# Patient Record
Sex: FEMALE
[De-identification: names, ages, dates, MRNs, and addresses within clinical notes are randomized per-mention and may not be internally consistent; named-entity substitution may affect disease eponyms.]

---

## 2023-07-07 ENCOUNTER — APPOINTMENT (OUTPATIENT)
Dept: DERMATOLOGY | Facility: CLINIC | Age: 33
End: 2023-07-07
Payer: SELF-PAY

## 2023-07-07 ENCOUNTER — TRANSCRIPTION ENCOUNTER (OUTPATIENT)
Age: 33
End: 2023-07-07

## 2023-07-07 DIAGNOSIS — L81.1 CHLOASMA: ICD-10-CM

## 2023-07-07 DIAGNOSIS — L30.9 DERMATITIS, UNSPECIFIED: ICD-10-CM

## 2023-07-07 PROBLEM — Z00.00 ENCOUNTER FOR PREVENTIVE HEALTH EXAMINATION: Status: ACTIVE | Noted: 2023-07-07

## 2023-07-07 PROCEDURE — 99204 OFFICE O/P NEW MOD 45 MIN: CPT

## 2023-07-07 RX ORDER — TACROLIMUS 1 MG/G
0.1 OINTMENT TOPICAL
Qty: 1 | Refills: 5 | Status: ACTIVE | COMMUNITY
Start: 2023-07-07 | End: 1900-01-01

## 2023-12-26 ENCOUNTER — APPOINTMENT (OUTPATIENT)
Dept: PSYCHIATRY | Facility: CLINIC | Age: 33
End: 2023-12-26
Payer: COMMERCIAL

## 2023-12-26 PROCEDURE — 99205 OFFICE O/P NEW HI 60 MIN: CPT

## 2023-12-26 NOTE — DISCUSSION/SUMMARY
[Low acute suicide risk] : Low acute suicide risk [No] : No [FreeTextEntry1] : RISK ASSESSMENT Acute risk factors for self-injurious/aggressive behaviors include: anxiety, depressive symptoms, work stress Chronic risk factors include: hx depression and anxiety, immigrant Protective factors include:  with supportive , domiciled, future oriented, help seeking, denies SIIP/NSSIIP/HIIP, no hx SAs/NSSIB/aggression, no past psych hosp, sobriety. Overall, the pt is at a low acute and low chronic risk for self-injurious, suicidal or aggressive behaviors, and is appropriate for outpatient care at this time.

## 2023-12-26 NOTE — PLAN
[FreeTextEntry4] : On initial assessment 12/26/2023: 34 yo F,  1.5 years ago,  is 3rd year medical resident, domiciled with , employed as physician, intensivist at [employer], PPH notable for anxiety,  past psych hosp, denies past SI/SAs, denies substance use, denies legal hx/hx of violence, no sig PMH, presents for treatment for anxiety and depression.    On initial assessment, symptoms are notable for depressive symptoms, and symptoms of anxiety, with associated muscle soreness, irritability, lower energy, easily fatigued, feeling keyed up. The differential includes anxiety, MDD, r/o OCPD. Plan to start Prozac 5mg since pt is medication naive, to up-titrate as tolerated.  PLAN -Discussed the diagnosis, treatment, alternatives to recommended treatment, risk Vs benefits of treatment and no treatment and alternative treatments. -Lab/other tests: advised to connect with PCP for bloodwork/annual physical -Medication: Start Prozac 5mg daily.  SSRI side effects including but not limited to GI side effects, headaches, dizziness, serotonin syndrome, hyponatremia, QT prolongation, weight gain, decreased libido, and black box warning of SI in patients younger than 24 were discussed. -Discussed recommendation for aerobic exercise -Discussed sleep hygiene -Educated patient of importance of remaining abstinent from drugs and alcohol. -Emergency procedures were discussed: pt. educated to call 911 or go to nearest ER for worsening of symptoms/suicidal/homicidal ideation. -Pt to connect with ind therapist soon, to provide writer with consent to contact. -RTC in 2 weeks or earlier as needed -Patient given opportunity to ask questions and their questions were answered and they expressed understanding and agreement with above plan.  I spent a total of 60 minutes reviewing past medical records, evaluating the patient, discussing treatment options with the patient, prescribing medication, and documenting in the EMR.

## 2023-12-26 NOTE — HISTORY OF PRESENT ILLNESS
[FreeTextEntry1] :  Pt seen by writer for initial appointment on 12/26/2023. ISTOP Reference #: [id number] nothing listed  Pt is accompanied by her , Allan Payne, with pt's verbal consent.   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   On interview, pt said that she recently started a new job and has some worsening anxiety and depression. Pt has had difficulty on and off since a teenager, but has never seen a psychiatrist before. Pt said that for the past several months has not been sleeping much, though improved over the past 2 weeks. Pt lives in [facility]. 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 Given the lack of enjoyment in work that was not expected, pt felt more depressed/anxious. Pt likes her boss. Pt was expecting 2 weeks of ICU per year, but will do 30 days in the next 90 days.  Per pt's , pt often works until 8-9pm even after she gets home completing tasks from work. Pt says that she has difficulty delegating tasks. Says that she was detail oriented, less so now due to the volume of her work.   Per pt's , pt has enjoyed cooking, caring for plants. Pt says that she has enjoyed travel. Pt said that she feels like she missed out on her 20's, would like to explore [place] more. Pt misses dancing and music. Pt says that she feels her  has been very supportive.   Mood: "better" Anhedonia: denies Motivation: low regarding work Sleep: asleep 10pm, wakes up 6am. Pt falls asleep on the couch while  is watching TV, then moves to bed around 2am. denies other interruptions. Longest latency to falling asleep is 30 min. At worst, before first day of work in ICU, was not able to sleep.  Appetite: better Energy: lower than would like Feelings of guilt/worthlessness: endorses SI: denies past or current  NSSI: denies past or current  HI: denies past or current    Anxiety: anxious about work, patients at work, had anxiety about immigration and finances that improved, hx anxiety about parents. Now feels that she is having ruminative thoughts 70-80% of the day. Muscle soreness: endorses Irritability: endorses Focus: denies issues  Endorses feeling easily fatigued. Endorses feeling keyed up Panic attacks: once a few years ago.   Disordered eating Hx: denies current or hx purging/restricting/binging   EtOH use:  denies past or current  Drug use:  denies past or current  Tobacco use:  denies past or current  Caffeine use: 1 cup coffee in AM, rarely 1 cup in PM, denies tea, soda once per month.   Current or past psychosis: denies past or current  Current or past nicolas:  denies past or current    Trauma: pt did not have any money when [place] came to the [place]. Pt lived in an apartment with 6 other people. Pt's parents offered to help pt, but pt declined.   Firearm access: denies Current medications: mvm  Therapy: denies, signed up for therapy, to start tomorrow.  Pt says that she primarily wants help with guilt about perceived wrong decisions, pervasive thoughts. [FreeTextEntry2] : Pt had pre-menstrual dysphoria 1-2 days before her menstruation would be tearful and anxious, then it would pass. Started medical school at age 18 years old in [place], did not like it, and had depression and anxiety.   Hx depression: November 2023 for over 2 weeks, +anhedonia, depressed, low energy, low appetite, less sleep, feelings of guilt/worthlessness, denies SIIP/NSSIIP/HIIP.

## 2023-12-26 NOTE — PHYSICAL EXAM
[None] : none [Cooperative] : cooperative [Euthymic] : euthymic [Depressed] : depressed [Anxious] : anxious [Full] : full [Clear] : clear [Linear/Goal Directed] : linear/goal directed [Average] : average [WNL] : within normal limits [de-identified] : ranging from anxious, dysphoric to smiling, congruent with content [FreeTextEntry7] : denies SIIP/NSSIIP/HIIP, no overt delusions

## 2023-12-26 NOTE — SOCIAL HISTORY
[FreeTextEntry1] : Born: [facility].  Grew up: Partly in [place], partly in [place]. Grew up with mother, father, and maternal grandfather and paternal grandmother and paternal grandmother. Only child. Father: hardworking, worked in finance, great parent. Very supportive. He was ill on and off with cirrhosis, RONQUILLO. He was very sick when pt was 2 year old. He got a transplant when pt was 22 years old. Pt only really got ill in the year leading up to the transplant. Mother: great parent, very involved with education.  at a large multinational firm. Despite being busy they were very present in pt's life. Both parents had high expectations of her.  Pt said that she went to medical school because her parents wanted her to do so. Pt said that she tried to quit several times, parents would not let her. [place] liked the way medicine is practiced here. Pt realized that she enjoys pulm crit care, pulm HTN specifically.  Pt knew Allan, her  for 5 years.  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

## 2024-01-11 ENCOUNTER — APPOINTMENT (OUTPATIENT)
Dept: PSYCHIATRY | Facility: CLINIC | Age: 34
End: 2024-01-11

## 2024-01-11 NOTE — HISTORY OF PRESENT ILLNESS
[FreeTextEntry1] : Social:  Medications: Prozac:  Mood: Anxiety: Panic attacks: Motivation: Anhedonia: Appetite: Sleep: Energy: Focus: Guilt/worthlessness: Thoughts of death: Thoughts of harming self: Thoughts of harming others:  EtOH use: Drug use: Tobacco use: Caffeine use:

## 2024-01-11 NOTE — PLAN
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  On initial assessment, symptoms are notable for depressive symptoms, and symptoms of anxiety, with associated muscle soreness, irritability, lower energy, easily fatigued, feeling keyed up. The differential includes anxiety, MDD, r/o OCPD. Plan to start Prozac 5mg since pt is medication naive, to up-titrate as tolerated.  On follow up assessments: 1/11/2024:  PLAN -Discussed the diagnosis, treatment, alternatives to recommended treatment, risk Vs benefits of treatment and no treatment and alternative treatments. -Lab/other tests: advised to connect with PCP for bloodwork/annual physical -Medication: Start Prozac 5mg daily.  SSRI side effects including but not limited to GI side effects, headaches, dizziness, serotonin syndrome, hyponatremia, QT prolongation, weight gain, decreased libido, and black box warning of SI in patients younger than 24 were discussed. -Discussed recommendation for aerobic exercise -Discussed sleep hygiene -Educated patient of importance of remaining abstinent from drugs and alcohol. -Emergency procedures were discussed: pt. educated to call 911 or go to nearest ER for worsening of symptoms/suicidal/homicidal ideation. -Pt to connect with ind therapist soon, to provide writer with consent to contact. -RTC in 2 weeks or earlier as needed -Patient given opportunity to ask questions and their questions were answered and they expressed understanding and agreement with above plan.

## 2024-02-02 ENCOUNTER — APPOINTMENT (OUTPATIENT)
Dept: PSYCHIATRY | Facility: CLINIC | Age: 34
End: 2024-02-02

## 2024-02-02 NOTE — PLAN
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  On initial assessment, symptoms are notable for depressive symptoms, and symptoms of anxiety, with associated muscle soreness, irritability, lower energy, easily fatigued, feeling keyed up. The differential includes anxiety, MDD, r/o OCPD. Plan to start Prozac 5mg since pt is medication naive, to up-titrate as tolerated.  On follow up assessment: 2/2/2024:  PLAN -Discussed the diagnosis, treatment, alternatives to recommended treatment, risk Vs benefits of treatment and no treatment and alternative treatments. -Lab/other tests: advised to connect with PCP for bloodwork/annual physical -Medication: Start Prozac 5mg daily.  SSRI side effects including but not limited to GI side effects, headaches, dizziness, serotonin syndrome, hyponatremia, QT prolongation, weight gain, decreased libido, and black box warning of SI in patients younger than 24 were discussed. -Discussed recommendation for aerobic exercise -Discussed sleep hygiene -Educated patient of importance of remaining abstinent from drugs and alcohol. -Emergency procedures were discussed: pt. educated to call 911 or go to nearest ER for worsening of symptoms/suicidal/homicidal ideation. -Pt to connect with ind therapist soon, to provide writer with consent to contact. -RTC in 2 weeks or earlier as needed -Patient given opportunity to ask questions and their questions were answered and they expressed understanding and agreement with above plan.

## 2024-02-02 NOTE — PLAN
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  On initial assessment, symptoms are notable for depressive symptoms, and symptoms of anxiety, with associated muscle soreness, irritability, lower energy, easily fatigued, feeling keyed up. The differential includes anxiety, MDD, r/o OCPD. Plan to start Prozac 5mg since pt is medication naive, to up-titrate as tolerated.  On follow up assessment: 2/2/2024:  PLAN -Discussed the diagnosis, treatment, alternatives to recommended treatment, risk Vs benefits of treatment and no treatment and alternative treatments. -Lab/other tests: advised to connect with PCP for bloodwork/annual physical -Medication: Start Prozac 5mg daily.  SSRI side effects including but not limited to GI side effects, headaches, dizziness, serotonin syndrome, hyponatremia, QT prolongation, weight gain, decreased libido, and black box warning of SI in patients younger than 24 were discussed. -Discussed recommendation for aerobic exercise -Discussed sleep hygiene -Educated patient of importance of remaining abstinent from drugs and alcohol. -Emergency procedures were discussed: pt. educated to call 911 or go to nearest ER for worsening of symptoms/suicidal/homicidal ideation. -Pt to connect with ind therapist soon, to provide writer with consent to contact. -RTC in 2 weeks or earlier as needed -Patient given opportunity to ask questions and their questions were answered and they expressed understanding and agreement with above plan.

## 2024-02-02 NOTE — DISCUSSION/SUMMARY
[FreeTextEntry1] : Writer called pt, pt said she rescheduled to 2/9/2024, is feeling better generally, to discuss more at next visit. No acute issues elicited at this time.

## 2024-02-09 ENCOUNTER — APPOINTMENT (OUTPATIENT)
Dept: PSYCHIATRY | Facility: CLINIC | Age: 34
End: 2024-02-09
Payer: COMMERCIAL

## 2024-02-09 PROCEDURE — 99214 OFFICE O/P EST MOD 30 MIN: CPT

## 2024-02-09 NOTE — HISTORY OF PRESENT ILLNESS
[FreeTextEntry1] : Social: Was working nights this past week. Work is still busy. To have meeting next week to discuss changing her schedule with her work.  is supportive, pt says her family wants her to quit.  matched to oncology fellowship at [facility].   Therapist gave homework to eat ice cream, go for a walk, feels it is helpful.  Medications: Prozac: Taking 5mg daily. Takes it before leaves for work. Endorses adherence. Denies side effects. Discussed plan to increase to 10mg daily dose.  Mood: Feeling a lot better, "much better." Anxiety: "50% better from the last time" Panic attacks: denies Motivation: slightly better Anhedonia: enjoyed going to [place] city. Appetite: good Sleep: short latency to falling asleep, rough schedule when working days is asleep 10pm, wakes up 6am. Pt falls asleep on the couch while  is watching TV, then moves to bed around 2am. Energy: improving but lower than would like. Focus: better Guilt/worthlessness: endorses. Feels this is her biggest symptom, though it is improving from before. Thoughts of death: denies Thoughts of harming self: denies Thoughts of harming others: denies  EtOH use: 1-2 drinks every 3 months Drug use: denies Tobacco use: denies Caffeine use: 1 cup of coffee per day.

## 2024-02-09 NOTE — PLAN
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  On initial assessment, symptoms are notable for depressive symptoms, and symptoms of anxiety, with associated muscle soreness, irritability, lower energy, easily fatigued, feeling keyed up. The differential includes anxiety, MDD, r/o OCPD. Plan to start Prozac 5mg since pt is medication naive, to up-titrate as tolerated.  On follow up assessment: 2/9/2024: Depression and anxiety symptoms improving, though with residual ongoing anxiety and significant feelings of guilt/worthlessness. Increasing Prozac to 10mg daily.  PLAN -Discussed the diagnosis, treatment, alternatives to recommended treatment, risk Vs benefits of treatment and no treatment and alternative treatments. -Lab/other tests: advised to connect with PCP for bloodwork/annual physical -Medication: Increase Prozac to 10mg daily.  SSRI side effects including but not limited to GI side effects, headaches, dizziness, serotonin syndrome, hyponatremia, QT prolongation, weight gain, decreased libido, and black box warning of SI in patients younger than 24 were discussed. -Discussed recommendation for aerobic exercise -Discussed sleep hygiene -Educated patient of importance of remaining abstinent from drugs and alcohol. -Emergency procedures were discussed: pt. educated to call 911 or go to nearest ER for worsening of symptoms/suicidal/homicidal ideation. -Individual therapy with Kaitlyn Van [phone] -RT in 2-4 weeks or earlier as needed -Patient given opportunity to ask questions and their questions were answered and they expressed understanding and agreement with above plan.

## 2024-02-09 NOTE — PHYSICAL EXAM
[None] : none [Cooperative] : cooperative [Euthymic] : euthymic [Full] : full [Clear] : clear [Linear/Goal Directed] : linear/goal directed [Average] : average [WNL] : within normal limits [FreeTextEntry8] : "much better" [de-identified] : ranging from anxious, less dysphoric, ranging smiling, congruent with content [FreeTextEntry7] : denies SIIP/NSSIIP/HIIP, no overt delusions

## 2024-03-07 ENCOUNTER — APPOINTMENT (OUTPATIENT)
Dept: PSYCHIATRY | Facility: CLINIC | Age: 34
End: 2024-03-07

## 2024-03-07 NOTE — PLAN
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  On initial assessment, symptoms are notable for depressive symptoms, and symptoms of anxiety, with associated muscle soreness, irritability, lower energy, easily fatigued, feeling keyed up. The differential includes anxiety, MDD, r/o OCPD. Plan to start Prozac 5mg since pt is medication naive, to up-titrate as tolerated.  On follow up assessment: 2/9/2024: Depression and anxiety symptoms improving, though with residual ongoing anxiety and significant feelings of guilt/worthlessness. Increasing Prozac to 10mg daily. 3/7/2024:  PLAN -Discussed the diagnosis, treatment, alternatives to recommended treatment, risk Vs benefits of treatment and no treatment and alternative treatments. -Lab/other tests: advised to connect with PCP for bloodwork/annual physical -Medication: Increase Prozac to 10mg daily.  SSRI side effects including but not limited to GI side effects, headaches, dizziness, serotonin syndrome, hyponatremia, QT prolongation, weight gain, decreased libido, and black box warning of SI in patients younger than 24 were discussed. -Discussed recommendation for aerobic exercise -Discussed sleep hygiene -Educated patient of importance of remaining abstinent from drugs and alcohol. -Emergency procedures were discussed: pt. educated to call 911 or go to nearest ER for worsening of symptoms/suicidal/homicidal ideation. -Individual therapy with Kaitlyn Van [phone] -[facility] in 2-4 weeks or earlier as needed -Patient given opportunity to ask questions and their questions were answered and they expressed understanding and agreement with above plan.

## 2024-03-07 NOTE — HISTORY OF PRESENT ILLNESS
[FreeTextEntry1] : Social: LOCATION LOCATIN LOCATION OF PT Medications: Prozac 10mg:  Mood: Anxiety: Panic attacks: Motivation: Anhedonia: Appetite: Sleep: Energy: Focus: Guilt/worthlessness: Thoughts of death: Thoughts of harming self: Thoughts of harming others:  EtOH use: Drug use: Tobacco use: Caffeine use:

## 2024-04-04 ENCOUNTER — NON-APPOINTMENT (OUTPATIENT)
Age: 34
End: 2024-04-04

## 2024-04-04 NOTE — DISCUSSION/SUMMARY
[FreeTextEntry1] : Pt called writer, writer called back: Pt said that work has been stressful, she is leaving her position to work somewhere else. Discussed option to setup appt to discuss Prozac dosing, pt declined at this time. No SI/NSSI/HI, no acute issues elicited at this time.

## 2024-05-31 ENCOUNTER — APPOINTMENT (OUTPATIENT)
Dept: OPHTHALMOLOGY | Facility: CLINIC | Age: 34
End: 2024-05-31
Payer: COMMERCIAL

## 2024-05-31 ENCOUNTER — NON-APPOINTMENT (OUTPATIENT)
Age: 34
End: 2024-05-31

## 2024-05-31 PROCEDURE — 00009: CPT | Mod: NC

## 2024-06-17 ENCOUNTER — APPOINTMENT (OUTPATIENT)
Dept: OPHTHALMOLOGY | Facility: CLINIC | Age: 34
End: 2024-06-17

## 2024-06-26 ENCOUNTER — APPOINTMENT (OUTPATIENT)
Dept: INTERNAL MEDICINE | Facility: CLINIC | Age: 34
End: 2024-06-26

## 2024-06-26 VITALS
SYSTOLIC BLOOD PRESSURE: 118 MMHG | WEIGHT: 117 LBS | BODY MASS INDEX: 18.58 KG/M2 | HEIGHT: 66.5 IN | TEMPERATURE: 98.2 F | HEART RATE: 82 BPM | DIASTOLIC BLOOD PRESSURE: 64 MMHG | OXYGEN SATURATION: 99 %

## 2024-06-26 DIAGNOSIS — Z78.9 OTHER SPECIFIED HEALTH STATUS: ICD-10-CM

## 2024-06-26 DIAGNOSIS — Z00.00 ENCOUNTER FOR GENERAL ADULT MEDICAL EXAMINATION W/OUT ABNORMAL FINDINGS: ICD-10-CM

## 2024-06-26 DIAGNOSIS — Z83.79 FAMILY HISTORY OF OTHER DISEASES OF THE DIGESTIVE SYSTEM: ICD-10-CM

## 2024-06-26 DIAGNOSIS — Z82.49 FAMILY HISTORY OF ISCHEMIC HEART DISEASE AND OTHER DISEASES OF THE CIRCULATORY SYSTEM: ICD-10-CM

## 2024-06-26 DIAGNOSIS — Z83.3 FAMILY HISTORY OF DIABETES MELLITUS: ICD-10-CM

## 2024-06-26 DIAGNOSIS — Z80.3 FAMILY HISTORY OF MALIGNANT NEOPLASM OF BREAST: ICD-10-CM

## 2024-06-26 PROCEDURE — 36415 COLL VENOUS BLD VENIPUNCTURE: CPT

## 2024-06-26 PROCEDURE — 99385 PREV VISIT NEW AGE 18-39: CPT

## 2024-06-27 ENCOUNTER — APPOINTMENT (OUTPATIENT)
Dept: PSYCHIATRY | Facility: CLINIC | Age: 34
End: 2024-06-27
Payer: COMMERCIAL

## 2024-06-27 DIAGNOSIS — F32.9 MAJOR DEPRESSIVE DISORDER, SINGLE EPISODE, UNSPECIFIED: ICD-10-CM

## 2024-06-27 DIAGNOSIS — F41.9 ANXIETY DISORDER, UNSPECIFIED: ICD-10-CM

## 2024-06-27 LAB
25(OH)D3 SERPL-MCNC: 25.3 NG/ML
ALBUMIN SERPL ELPH-MCNC: 4.5 G/DL
ALP BLD-CCNC: 67 U/L
ALT SERPL-CCNC: 24 U/L
ANION GAP SERPL CALC-SCNC: 15 MMOL/L
APPEARANCE: CLEAR
AST SERPL-CCNC: 21 U/L
BACTERIA: ABNORMAL /HPF
BILIRUB SERPL-MCNC: 1.5 MG/DL
BILIRUBIN URINE: NEGATIVE
BLOOD URINE: NEGATIVE
BUN SERPL-MCNC: 16 MG/DL
CALCIUM SERPL-MCNC: 10 MG/DL
CAST: 0 /LPF
CHLORIDE SERPL-SCNC: 104 MMOL/L
CHOLEST SERPL-MCNC: 172 MG/DL
CO2 SERPL-SCNC: 22 MMOL/L
COLOR: YELLOW
CREAT SERPL-MCNC: 0.76 MG/DL
CRP SERPL-MCNC: <3 MG/L
DSDNA AB SER-ACNC: <1 IU/ML
EGFR: 106 ML/MIN/1.73M2
EPITHELIAL CELLS: 4 /HPF
ERYTHROCYTE [SEDIMENTATION RATE] IN BLOOD BY WESTERGREN METHOD: 23 MM/HR
ESTIMATED AVERAGE GLUCOSE: 103 MG/DL
FERRITIN SERPL-MCNC: 33 NG/ML
FOLATE SERPL-MCNC: 19.5 NG/ML
GLUCOSE QUALITATIVE U: NEGATIVE MG/DL
GLUCOSE SERPL-MCNC: 88 MG/DL
HBA1C MFR BLD HPLC: 5.2 %
HCT VFR BLD CALC: 44.5 %
HDLC SERPL-MCNC: 59 MG/DL
HGB BLD-MCNC: 14.1 G/DL
IRON SATN MFR SERPL: 31 %
IRON SERPL-MCNC: 124 UG/DL
KETONES URINE: NEGATIVE MG/DL
LDLC SERPL CALC-MCNC: 97 MG/DL
LEUKOCYTE ESTERASE URINE: ABNORMAL
MCHC RBC-ENTMCNC: 29.1 PG
MCHC RBC-ENTMCNC: 31.7 GM/DL
MCV RBC AUTO: 91.8 FL
MICROSCOPIC-UA: NORMAL
NITRITE URINE: NEGATIVE
NONHDLC SERPL-MCNC: 114 MG/DL
PH URINE: 7.5
PLATELET # BLD AUTO: 304 K/UL
POTASSIUM SERPL-SCNC: 4.7 MMOL/L
PROT SERPL-MCNC: 7.9 G/DL
PROTEIN URINE: NORMAL MG/DL
RBC # BLD: 4.85 M/UL
RBC # FLD: 14.2 %
RED BLOOD CELLS URINE: 5 /HPF
RHEUMATOID FACT SER QL: <10 IU/ML
SODIUM SERPL-SCNC: 140 MMOL/L
SPECIFIC GRAVITY URINE: 1.02
TIBC SERPL-MCNC: 407 UG/DL
TRIGL SERPL-MCNC: 87 MG/DL
TSH SERPL-ACNC: 1.8 UIU/ML
UIBC SERPL-MCNC: 283 UG/DL
UROBILINOGEN URINE: 0.2 MG/DL
VIT B12 SERPL-MCNC: 590 PG/ML
WBC # FLD AUTO: 5.46 K/UL
WHITE BLOOD CELLS URINE: 0 /HPF

## 2024-06-27 PROCEDURE — 99214 OFFICE O/P EST MOD 30 MIN: CPT | Mod: 95

## 2024-06-27 PROCEDURE — G2211 COMPLEX E/M VISIT ADD ON: CPT | Mod: 95

## 2024-06-28 ENCOUNTER — APPOINTMENT (OUTPATIENT)
Dept: OPHTHALMOLOGY | Facility: CLINIC | Age: 34
End: 2024-06-28

## 2024-06-28 LAB — ANA SER IF-ACNC: NEGATIVE

## 2024-07-01 ENCOUNTER — APPOINTMENT (OUTPATIENT)
Dept: HUMAN REPRODUCTION | Facility: CLINIC | Age: 34
End: 2024-07-01

## 2024-07-02 ENCOUNTER — APPOINTMENT (OUTPATIENT)
Dept: HUMAN REPRODUCTION | Facility: CLINIC | Age: 34
End: 2024-07-02
Payer: COMMERCIAL

## 2024-07-02 LAB — DNA PLOIDY SPEC FC-IMP: NORMAL

## 2024-07-02 PROCEDURE — 99205 OFFICE O/P NEW HI 60 MIN: CPT

## 2024-07-03 ENCOUNTER — APPOINTMENT (OUTPATIENT)
Dept: HUMAN REPRODUCTION | Facility: CLINIC | Age: 34
End: 2024-07-03
Payer: COMMERCIAL

## 2024-07-03 PROCEDURE — 99213 OFFICE O/P EST LOW 20 MIN: CPT | Mod: 25

## 2024-07-03 PROCEDURE — 76857 US EXAM PELVIC LIMITED: CPT

## 2024-07-03 PROCEDURE — 36415 COLL VENOUS BLD VENIPUNCTURE: CPT

## 2024-12-27 ENCOUNTER — APPOINTMENT (OUTPATIENT)
Dept: HUMAN REPRODUCTION | Facility: CLINIC | Age: 34
End: 2024-12-27
Payer: COMMERCIAL

## 2024-12-27 PROCEDURE — 99215 OFFICE O/P EST HI 40 MIN: CPT

## 2025-01-13 ENCOUNTER — NON-APPOINTMENT (OUTPATIENT)
Age: 35
End: 2025-01-13

## 2025-01-14 ENCOUNTER — APPOINTMENT (OUTPATIENT)
Dept: PSYCHIATRY | Facility: CLINIC | Age: 35
End: 2025-01-14
Payer: COMMERCIAL

## 2025-01-14 PROCEDURE — 99214 OFFICE O/P EST MOD 30 MIN: CPT | Mod: 95

## 2025-01-15 RX ORDER — FLUOXETINE HYDROCHLORIDE 10 MG/1
10 CAPSULE ORAL DAILY
Qty: 45 | Refills: 0 | Status: ACTIVE | COMMUNITY
Start: 2025-01-15 | End: 1900-01-01

## 2025-02-28 ENCOUNTER — APPOINTMENT (OUTPATIENT)
Dept: PSYCHIATRY | Facility: CLINIC | Age: 35
End: 2025-02-28
Payer: COMMERCIAL

## 2025-02-28 DIAGNOSIS — F32.9 MAJOR DEPRESSIVE DISORDER, SINGLE EPISODE, UNSPECIFIED: ICD-10-CM

## 2025-02-28 DIAGNOSIS — F41.9 ANXIETY DISORDER, UNSPECIFIED: ICD-10-CM

## 2025-02-28 PROCEDURE — 99214 OFFICE O/P EST MOD 30 MIN: CPT

## 2025-02-28 PROCEDURE — G2211 COMPLEX E/M VISIT ADD ON: CPT

## 2025-03-07 ENCOUNTER — APPOINTMENT (OUTPATIENT)
Dept: ANTEPARTUM | Facility: CLINIC | Age: 35
End: 2025-03-07
Payer: COMMERCIAL

## 2025-03-07 ENCOUNTER — ASOB RESULT (OUTPATIENT)
Age: 35
End: 2025-03-07

## 2025-03-07 PROCEDURE — 76813 OB US NUCHAL MEAS 1 GEST: CPT

## 2025-03-07 PROCEDURE — 76801 OB US < 14 WKS SINGLE FETUS: CPT

## 2025-05-05 ENCOUNTER — ASOB RESULT (OUTPATIENT)
Age: 35
End: 2025-05-05

## 2025-05-05 ENCOUNTER — APPOINTMENT (OUTPATIENT)
Dept: ANTEPARTUM | Facility: CLINIC | Age: 35
End: 2025-05-05
Payer: COMMERCIAL

## 2025-05-05 PROCEDURE — 76811 OB US DETAILED SNGL FETUS: CPT

## 2025-05-23 ENCOUNTER — APPOINTMENT (OUTPATIENT)
Dept: PSYCHIATRY | Facility: CLINIC | Age: 35
End: 2025-05-23
Payer: COMMERCIAL

## 2025-05-23 DIAGNOSIS — F41.9 ANXIETY DISORDER, UNSPECIFIED: ICD-10-CM

## 2025-05-23 DIAGNOSIS — F32.9 MAJOR DEPRESSIVE DISORDER, SINGLE EPISODE, UNSPECIFIED: ICD-10-CM

## 2025-05-23 DIAGNOSIS — F32.5 MAJOR DEPRESSIVE DISORDER, SINGLE EPISODE, IN FULL REMISSION: ICD-10-CM

## 2025-05-23 PROCEDURE — 99214 OFFICE O/P EST MOD 30 MIN: CPT | Mod: 95

## 2025-05-23 PROCEDURE — G2211 COMPLEX E/M VISIT ADD ON: CPT | Mod: 95

## 2025-05-26 ENCOUNTER — TRANSCRIPTION ENCOUNTER (OUTPATIENT)
Age: 35
End: 2025-05-26

## 2025-06-23 ENCOUNTER — NON-APPOINTMENT (OUTPATIENT)
Age: 35
End: 2025-06-23

## 2025-06-25 ENCOUNTER — APPOINTMENT (OUTPATIENT)
Dept: MATERNAL FETAL MEDICINE | Facility: CLINIC | Age: 35
End: 2025-06-25

## 2025-06-25 ENCOUNTER — APPOINTMENT (OUTPATIENT)
Dept: PEDIATRIC CARDIOLOGY | Facility: CLINIC | Age: 35
End: 2025-06-25

## 2025-06-25 ENCOUNTER — ASOB RESULT (OUTPATIENT)
Age: 35
End: 2025-06-25

## 2025-06-25 PROBLEM — O24.419 GESTATIONAL DIABETES MELLITUS (GDM), ANTEPARTUM, GESTATIONAL DIABETES METHOD OF CONTROL UNSPECIFIED: Status: ACTIVE | Noted: 2025-06-25

## 2025-06-25 PROCEDURE — 76825 ECHO EXAM OF FETAL HEART: CPT

## 2025-06-25 PROCEDURE — 99202 OFFICE O/P NEW SF 15 MIN: CPT | Mod: 25

## 2025-06-25 PROCEDURE — 93325 DOPPLER ECHO COLOR FLOW MAPG: CPT | Mod: 59

## 2025-06-25 PROCEDURE — 76820 UMBILICAL ARTERY ECHO: CPT

## 2025-06-25 PROCEDURE — 76827 ECHO EXAM OF FETAL HEART: CPT

## 2025-06-25 PROCEDURE — G0109 DIAB MANAGE TRN IND/GROUP: CPT | Mod: 95

## 2025-06-25 PROCEDURE — 76821 MIDDLE CEREBRAL ARTERY ECHO: CPT

## 2025-06-25 RX ORDER — URINE ACETONE TEST STRIPS
STRIP MISCELLANEOUS
Qty: 2 | Refills: 1 | Status: ACTIVE | COMMUNITY
Start: 2025-06-25 | End: 1900-01-01

## 2025-06-25 RX ORDER — LANCETS 33 GAUGE
EACH MISCELLANEOUS
Qty: 1 | Refills: 2 | Status: ACTIVE | COMMUNITY
Start: 2025-06-25 | End: 1900-01-01

## 2025-06-25 RX ORDER — BLOOD-GLUCOSE SENSOR
EACH MISCELLANEOUS
Qty: 3 | Refills: 3 | Status: ACTIVE | COMMUNITY
Start: 2025-06-25 | End: 1900-01-01

## 2025-06-25 RX ORDER — BLOOD-GLUCOSE METER
W/DEVICE KIT MISCELLANEOUS
Qty: 1 | Refills: 0 | Status: ACTIVE | COMMUNITY
Start: 2025-06-25 | End: 1900-01-01

## 2025-06-25 RX ORDER — BLOOD-GLUCOSE METER
KIT MISCELLANEOUS
Qty: 2 | Refills: 2 | Status: ACTIVE | COMMUNITY
Start: 2025-06-25 | End: 1900-01-01

## 2025-06-27 RX ORDER — BLOOD-GLUCOSE SENSOR
EACH MISCELLANEOUS
Qty: 2 | Refills: 3 | Status: ACTIVE | COMMUNITY
Start: 2025-06-26 | End: 1900-01-01

## 2025-07-09 ENCOUNTER — APPOINTMENT (OUTPATIENT)
Dept: MATERNAL FETAL MEDICINE | Facility: CLINIC | Age: 35
End: 2025-07-09

## 2025-07-09 ENCOUNTER — NON-APPOINTMENT (OUTPATIENT)
Age: 35
End: 2025-07-09

## 2025-07-09 ENCOUNTER — ASOB RESULT (OUTPATIENT)
Age: 35
End: 2025-07-09

## 2025-07-09 PROCEDURE — G0108 DIAB MANAGE TRN  PER INDIV: CPT | Mod: 95

## 2025-07-14 ENCOUNTER — APPOINTMENT (OUTPATIENT)
Dept: ANTEPARTUM | Facility: CLINIC | Age: 35
End: 2025-07-14
Payer: COMMERCIAL

## 2025-07-14 ENCOUNTER — OUTPATIENT (OUTPATIENT)
Dept: INPATIENT UNIT | Facility: HOSPITAL | Age: 35
LOS: 1 days | End: 2025-07-14
Payer: COMMERCIAL

## 2025-07-14 ENCOUNTER — ASOB RESULT (OUTPATIENT)
Age: 35
End: 2025-07-14

## 2025-07-14 VITALS
TEMPERATURE: 98 F | SYSTOLIC BLOOD PRESSURE: 122 MMHG | OXYGEN SATURATION: 100 % | HEART RATE: 99 BPM | RESPIRATION RATE: 16 BRPM | DIASTOLIC BLOOD PRESSURE: 80 MMHG

## 2025-07-14 VITALS — HEART RATE: 87 BPM | SYSTOLIC BLOOD PRESSURE: 113 MMHG | DIASTOLIC BLOOD PRESSURE: 73 MMHG

## 2025-07-14 DIAGNOSIS — O26.899 OTHER SPECIFIED PREGNANCY RELATED CONDITIONS, UNSPECIFIED TRIMESTER: ICD-10-CM

## 2025-07-14 LAB
APPEARANCE UR: CLEAR — SIGNIFICANT CHANGE UP
BILIRUB UR-MCNC: NEGATIVE — SIGNIFICANT CHANGE UP
COLOR SPEC: SIGNIFICANT CHANGE UP
DIFF PNL FLD: NEGATIVE — SIGNIFICANT CHANGE UP
FIBRINOGEN PPP-MCNC: 385 MG/DL — SIGNIFICANT CHANGE UP (ref 200–465)
GLUCOSE UR QL: NEGATIVE MG/DL — SIGNIFICANT CHANGE UP
HCT VFR BLD CALC: 34.2 % — LOW (ref 34.5–45)
HGB BLD-MCNC: 11 G/DL — LOW (ref 11.5–15.5)
KETONES UR QL: NEGATIVE MG/DL — SIGNIFICANT CHANGE UP
LEUKOCYTE ESTERASE UR-ACNC: NEGATIVE — SIGNIFICANT CHANGE UP
MCHC RBC-ENTMCNC: 30.8 PG — SIGNIFICANT CHANGE UP (ref 27–34)
MCHC RBC-ENTMCNC: 32.2 G/DL — SIGNIFICANT CHANGE UP (ref 32–36)
MCV RBC AUTO: 95.8 FL — SIGNIFICANT CHANGE UP (ref 80–100)
NITRITE UR-MCNC: NEGATIVE — SIGNIFICANT CHANGE UP
NRBC # BLD AUTO: 0 K/UL — SIGNIFICANT CHANGE UP (ref 0–0)
NRBC # FLD: 0 K/UL — SIGNIFICANT CHANGE UP (ref 0–0)
NRBC BLD AUTO-RTO: 0 /100 WBCS — SIGNIFICANT CHANGE UP (ref 0–0)
PH UR: 6.5 — SIGNIFICANT CHANGE UP (ref 5–8)
PLATELET # BLD AUTO: 280 K/UL — SIGNIFICANT CHANGE UP (ref 150–400)
PMV BLD: 10.3 FL — SIGNIFICANT CHANGE UP (ref 7–13)
PROT UR-MCNC: SIGNIFICANT CHANGE UP MG/DL
RBC # BLD: 3.57 M/UL — LOW (ref 3.8–5.2)
RBC # FLD: 15.1 % — HIGH (ref 10.3–14.5)
SP GR SPEC: 1.02 — SIGNIFICANT CHANGE UP (ref 1–1.03)
UROBILINOGEN FLD QL: 1 MG/DL — SIGNIFICANT CHANGE UP (ref 0.2–1)
WBC # BLD: 8.05 K/UL — SIGNIFICANT CHANGE UP (ref 3.8–10.5)
WBC # FLD AUTO: 8.05 K/UL — SIGNIFICANT CHANGE UP (ref 3.8–10.5)

## 2025-07-14 PROCEDURE — 59025 FETAL NON-STRESS TEST: CPT | Mod: 26

## 2025-07-14 PROCEDURE — 99221 1ST HOSP IP/OBS SF/LOW 40: CPT | Mod: 25

## 2025-07-14 PROCEDURE — 76817 TRANSVAGINAL US OBSTETRIC: CPT | Mod: 26

## 2025-07-14 RX ORDER — FLUOXETINE HYDROCHLORIDE 20 MG/1
1 CAPSULE ORAL
Refills: 0 | DISCHARGE

## 2025-07-14 RX ORDER — PRENATAL 136/IRON/FOLIC ACID 27 MG-1 MG
1 TABLET ORAL
Refills: 0 | DISCHARGE

## 2025-07-14 NOTE — OB PROVIDER TRIAGE NOTE - NSHPPHYSICALEXAM_GEN_ALL_CORE
Vital Signs Last 24 Hrs  T(C): 36.4 (14 Jul 2025 17:46), Max: 36.4 (14 Jul 2025 17:34)  T(F): 97.52 (14 Jul 2025 17:46), Max: 97.52 (14 Jul 2025 17:46)  HR: 90 (14 Jul 2025 17:48) (90 - 99)  BP: 109/66 (14 Jul 2025 17:48) (109/66 - 122/80)  BP(mean): --  RR: 16 (14 Jul 2025 17:46) (16 - 16)  SpO2: 100% (14 Jul 2025 17:34) (100% - 100%)    Parameters below as of 14 Jul 2025 17:34  Patient On (Oxygen Delivery Method): room air  General: Female sitting comfortably   Neuro: No facial asymmetry, no slurred speech, moves all 4 extremities  Mood: Alert and lucid, appropriate mood and affect  Head: Normocephalic. Atraumatic.   Eyes: No discharge, lids normal, conjunctiva normal  Lungs: No respiratory distress  Abdomen: Soft, nontender. Gravid. No contractions on palpation  Extremities: No LE edema bilaterally  NST  Baseline  (   145      ) BPM  Variability ( X )  Moderate   (  ) Minimal  (  ) Absent  (  )  Marked  Accelerations (  ) 15x15   ( X ) 10x10  (  ) no  Decelerations (  X) no  (  ) Variable  (  ) Early  (  ) Late      Description _________  Contractions (X  ) no  (  ) yes     Description  __________  Interpretation (X  ) reactive   (  )  non-reactive  Saved in ASOB- TAUS: cephalic presentation, posterior placenta, RAMIREZ 17.63cm, BPP 8/8, M-mode 154bpm  SSE: scant amount of leukorrhea, cervix appear close.  no bleeding from os, no bleeding with valsalva, no pooling, no membranes visualized, no bleeding noted, FFN collected and held  Saved in ASOB- TVUS: cervical length 4.3-4.4, no dynamical changes noted. exam chaperoned by: RICHI Ruelas  VE: 0/0/-3

## 2025-07-14 NOTE — OB PROVIDER TRIAGE NOTE - HISTORY OF PRESENT ILLNESS
36 yo , EGA@30 2/7 weeks, presented to D&T with c/o Right mid abdominal pain, that come and goes every 10 mns, Pain 7/10, Pt report that she did not take any pain medication. denies vaginal bleeding, leakage of fluid, and reports positive fetal movement.  Denies fever, chills, headaches,  nausea, vomiting, diarrhea, constipation, urinary symptoms, or sexual activities in the past 2-4 days.    Prenatal care with Dr. Ramos/ next appointment tomorrow AM  Prenatal course is uncomplicated as per patient    Meds: PNV, Prozac PO  Allergies: NKDA

## 2025-07-14 NOTE — OB PROVIDER TRIAGE NOTE - NSHPLABSRESULTS_GEN_ALL_CORE
Fibrinogen-385  CBC Full  -  ( 2025 19:15 )  WBC Count : 8.05 K/uL  RBC Count : 3.57 M/uL  Hemoglobin : 11.0 g/dL  Hematocrit : 34.2 %  Platelet Count - Automated : 280 K/uL  Mean Cell Volume : 95.8 fl  Mean Cell Hemoglobin : 30.8 pg  Mean Cell Hemoglobin Concentration : 32.2 g/dL  Auto Neutrophil # : x  Auto Lymphocyte # : x  Auto Monocyte # : x  Auto Eosinophil # : x  Auto Basophil # : x  Auto Neutrophil % : x  Auto Lymphocyte % : x  Auto Monocyte % : x  Auto Eosinophil % : x  Auto Basophil % : x     Urinalysis Basic - ( 2025 19:15 )    Color: Dark Yellow / Appearance: Clear / S.022 / pH: x  Gluc: x / Ketone: x  / Bili: Negative / Urobili: 1.0 mg/dL   Blood: x / Protein: Trace mg/dL / Nitrite: Negative   Leuk Esterase: Negative / RBC: x / WBC x   Sq Epi: x / Non Sq Epi: x / Bacteria: x

## 2025-07-14 NOTE — OB PROVIDER TRIAGE NOTE - NSOBPROVIDERNOTE_OBGYN_ALL_OB_FT
34 yo , EGA@30 2/7 weeks, presented to [facility]&T with c/o Right mid abdominal pain  abruption labs sent   UA  pending result  discuss with Dr. Figueroa   Pt to discharged home after labs result.   discuss with the night shift    ria Fuller NP 34 yo , EGA@30 2/7 weeks, presented to [facility]&T with c/o Right mid abdominal pain  abruption labs sent   UA  pending result  discuss with Dr. Figueroa   Pt to discharged home after labs result.   discuss with the night shift    ria Fuller NP  Received handoff report from dayshift ACP  Labs reviewed   Patient cleared for discharge home   Return to triage if experiencing vaginal leaking, vaginal bleeding, decreased fetal movement, contraction pain, headache, chest pain, blurred vision, dizziness, SOB  MELISSA Martino, NP 34 yo , EGA@30 2/7 weeks, presented to D&T with c/o Right mid abdominal pain  abruption labs sent   UA  pending result  discuss with Dr. Figueroa   Pt to discharged home after labs result.   discuss with the night shift    ria Fuller NP  Received handoff report from dayshift ACP  Labs reviewed   Patient cleared for discharge home   Return to triage if experiencing vaginal leaking, vaginal bleeding, decreased fetal movement, contraction pain, headache, chest pain, blurred vision, dizziness, SOB  Discharged home @  MELISSA Martino NP

## 2025-07-14 NOTE — OB RN TRIAGE NOTE - NS_FETALMOVEMENT_OBGYN_ALL_OB
Alert and oriented to person, place and time/Patient baseline mental status/Awake Present, unchanged

## 2025-07-17 DIAGNOSIS — Z3A.30 30 WEEKS GESTATION OF PREGNANCY: ICD-10-CM

## 2025-07-17 DIAGNOSIS — O26.893 OTHER SPECIFIED PREGNANCY RELATED CONDITIONS, THIRD TRIMESTER: ICD-10-CM

## 2025-07-17 DIAGNOSIS — R10.9 UNSPECIFIED ABDOMINAL PAIN: ICD-10-CM

## 2025-07-23 PROBLEM — Z78.9 OTHER SPECIFIED HEALTH STATUS: Chronic | Status: ACTIVE | Noted: 2025-07-14

## 2025-07-28 ENCOUNTER — APPOINTMENT (OUTPATIENT)
Dept: PSYCHIATRY | Facility: CLINIC | Age: 35
End: 2025-07-28
Payer: COMMERCIAL

## 2025-07-28 DIAGNOSIS — F32.5 MAJOR DEPRESSIVE DISORDER, SINGLE EPISODE, IN FULL REMISSION: ICD-10-CM

## 2025-07-28 DIAGNOSIS — F41.9 ANXIETY DISORDER, UNSPECIFIED: ICD-10-CM

## 2025-07-28 PROCEDURE — 99214 OFFICE O/P EST MOD 30 MIN: CPT | Mod: 95

## 2025-07-28 PROCEDURE — G2211 COMPLEX E/M VISIT ADD ON: CPT | Mod: 95

## 2025-08-07 ENCOUNTER — APPOINTMENT (OUTPATIENT)
Dept: ANTEPARTUM | Facility: CLINIC | Age: 35
End: 2025-08-07

## 2025-08-07 ENCOUNTER — ASOB RESULT (OUTPATIENT)
Age: 35
End: 2025-08-07

## 2025-08-07 PROCEDURE — 76816 OB US FOLLOW-UP PER FETUS: CPT

## 2025-08-07 PROCEDURE — 76819 FETAL BIOPHYS PROFIL W/O NST: CPT | Mod: 59

## 2025-08-15 ENCOUNTER — APPOINTMENT (OUTPATIENT)
Dept: PEDIATRICS | Facility: CLINIC | Age: 35
End: 2025-08-15
Payer: COMMERCIAL

## 2025-08-15 DIAGNOSIS — Z76.81 EXPECTANT PARENT(S) PREBIRTH PEDIATRICIAN VISIT: ICD-10-CM

## 2025-08-15 PROCEDURE — 99203 OFFICE O/P NEW LOW 30 MIN: CPT | Mod: 95

## 2025-09-12 ENCOUNTER — TRANSCRIPTION ENCOUNTER (OUTPATIENT)
Age: 35
End: 2025-09-12

## 2025-09-12 ENCOUNTER — APPOINTMENT (OUTPATIENT)
Dept: ANTEPARTUM | Facility: CLINIC | Age: 35
End: 2025-09-12